# Patient Record
Sex: FEMALE | Race: WHITE
[De-identification: names, ages, dates, MRNs, and addresses within clinical notes are randomized per-mention and may not be internally consistent; named-entity substitution may affect disease eponyms.]

---

## 2019-04-05 ENCOUNTER — HOSPITAL ENCOUNTER (EMERGENCY)
Dept: HOSPITAL 80 - FED | Age: 24
Discharge: HOME | End: 2019-04-05
Payer: MEDICAID

## 2019-04-05 VITALS — DIASTOLIC BLOOD PRESSURE: 79 MMHG | SYSTOLIC BLOOD PRESSURE: 121 MMHG

## 2019-04-05 DIAGNOSIS — S92.251A: ICD-10-CM

## 2019-04-05 DIAGNOSIS — S30.810A: ICD-10-CM

## 2019-04-05 DIAGNOSIS — S81.811A: ICD-10-CM

## 2019-04-05 DIAGNOSIS — S92.511A: ICD-10-CM
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
## 2019-04-05 DIAGNOSIS — S92.121A: ICD-10-CM

## 2019-04-05 DIAGNOSIS — S90.511A: ICD-10-CM

## 2019-04-05 DIAGNOSIS — S92.231A: ICD-10-CM

## 2019-04-05 DIAGNOSIS — S92.211A: Primary | ICD-10-CM

## 2019-04-05 DIAGNOSIS — S62.316A: ICD-10-CM

## 2019-04-05 LAB — PLATELET # BLD: 231 10^3/UL (ref 150–400)

## 2019-04-05 NOTE — EDPHY
HPI/HX/ROS/PE/MDM


Narrative: 








CHIEF COMPLAINT: Fall climbing, FTA 





HPI:    





This patient is a healthy 23-year-old female arriving via private vehicle, 

brought from the waiting room as a full trauma activation due to a reported 

40ft fall when climbing this morning around 10:15am. She was climbing the first 

flatiron this morning and states she slid and fell about 40ft. She states she 

landed on a ledge which prevented her from falling farther. She did strike her 

head but denies any loss of consciousness. She had a prolonged extrication by 

[facility] from the [facility]. After rescue, she declined ambulance 

transport. She presents to the emergency department by private vehicle 

primarily complaining of right leg pain, and she is concerned that her "ankle 

doesn't look awesome". The ankle has been splinted by [facility] personnel. She 

sustained a laceration to her right knee as well. She denies any other recent 

trauma or illness. She is well appearing, alert and oriented. 





REVIEW OF SYSTEMS:


A comprehensive 10 system review of systems is otherwise negative aside from 

elements mentioned in the history of present illness and medical decision 

making.





PMH: Denies





SOCIAL HISTORY: Mother at bedside. Recent [employer] graduate. Works as a substitute 

teacher. 





PHYSICAL EXAM:


General:Patient is alert, in no acute distress.


Head: Normocephalic. Abrasion to left temple, otherwise atraumatic. 


ENT:Eyes are normal to inspection.  ENT inspection normal. No hemotympanum. 


Neck: Normal inspection.  Full range of motion.


Respiratory:No respiratory distress.  Breath sounds normal bilaterally.


Cardiovascular: Regular rate and rhythm.  Strong peripheral pulses.  Normal cap 

refill.


Abdomen:The abdomen is nontender to palpation. There are no peritoneal signs. 

There are normal bowel sounds.


Back: Abrasion to right buttock. Normal to inspection.  No tenderness to 

palpation, no step-offs.


Skin: Normal color.  No rash.  Warm and dry.


Extremities: Obvious deformity/dislocation to right ankle, intact  Abrasion to 

right lateral malleolus. 4cm linear laceration over right shin proximal to the 

knee. Full range of motion.


Neuro: Oriented x3.  Normal motor function.  Normal sensory function.





ED Course: 





14:46 Met patient on arrival. 23-year-old female presents with obvious 

deformity to the right ankle as well as various abrasions and a laceration 

after part sliding, part falling about 40 feet down the first flatiron while 

climbing this morning around 10:15am. Dr. Vallejo, general surgeon, at 

bedside. Patient is alert and oriented, arrived by private vehicle. She is well-

appearing. Plan to administer 4mg IV morphine, 1L IV NS for symptom relief. 





15:00 Dr. Stone and I agree to downgrade the trauma alert. Downgraded at 

this time. 





Reviewed x-rays. Evidence of mid-foot fracture. 





The patient seems to have sustained an isolated right ankle injury. No other 

injuries identified. Plan to consult with orthopedics. 





15:12 Spoke with Dr. Silver, orthopedic specialist. He will review the patient'

s x-ray. 





15:23 Consulted with Dr. Silver. Recommends reduction of patient's subtalar 

dislocation here in the emergency department followed by three-way splint. 





Patients mother now at bedside. She notes the patient has history of congenital 

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 

considerable physical therapy. 





15:30 Spoke with Dr. Silver. He recommends we proceed with reduction at this 

time. 





16:05 Attempted reduction with 100mcg IV Fentanyl. Unable to reduce. 





16:15 Spoke with Dr. Silver. He requests CT of the right foot. When this is 

obtained he will come to evaluate for possible reduction with conscious 

sedation. 





Laceration Repair: 


Verbal consent was obtained from the patient. The linear 4cm laceration on the 

right shin was anesthetized using bupivacaine with epinephrine. The wound was 

cleaned with standard ED protocol, draped and explored to its base with a 

gloved finger. The wound was repaired in single layer technique with four 4-0 

Prolene sutures. The wound repair was simple. The procedure was performed by 

myself, Dr. Eid.





Dr. Silver has reviewed CT and assessed the patient. Plan for splint placement 

under standard ED protocol and followup outpatient with orthopedics. 





Reassessed patient. Discussed imaging results and the plan for orthopedic 

followup. Return precautions discussed. She and her mother are comfortable with 

this plan. 





- Data Points


Imaging Results: 


 Imaging Impressions





Ankle X-Ray  04/05/19 14:52


Impression: 


1. Intact ankle mortise.


2. Derangement mid foot with several fractures and disrupted joint spaces. 

Recommend CT of the foot and ankle to optimally characterize.








Tibia/Fibula X-Ray  04/05/19 14:52


Impression: Intact shaft of tibia and fibula.








Extremity CT  04/05/19 16:13


Impression:


1. Multiple comminuted displaced fractures of the right foot tarsal bones 

especially the cuboid and navicular bones, third toe proximal phalanx base, 

with smaller fracture fragments involving the talus, cuneiforms, and base of 

the fifth metacarpal as described above.


2. Recommend dedicated x-rays of the right foot or toes.


 


Findings and recommendations discussed with Emergency Department physician, 

Fortino Eid MD, at 1720 hour, 4/5/2019.


 


Final report concurs with initial preliminary interpretation.


 


 











Imaging: Discussed imaging studies w/ On call Radiologist, I viewed and 

interpreted images myself


Laboratory Results: 


 Laboratory Results





 04/05/19 15:17 





 04/05/19 15:17 





 











  04/05/19 04/05/19 04/05/19





  15:17 15:17 15:17


 


WBC      10.34 10^3/uL H 10^3/uL





     (3.80-9.50) 


 


RBC      5.05 10^6/uL 10^6/uL





     (4.18-5.33) 


 


Hgb      15.0 g/dL g/dL





     (12.6-16.3) 


 


Hct      44.6 % %





     (38.0-47.0) 


 


MCV      88.3 fL fL





     (81.5-99.8) 


 


MCH      29.7 pg pg





     (27.9-34.1) 


 


MCHC      33.6 g/dL g/dL





     (32.4-36.7) 


 


RDW      12.8 % %





     (11.5-15.2) 


 


Plt Count      231 10^3/uL 10^3/uL





     (150-400) 


 


MPV      9.3 fL fL





     (8.7-11.7) 


 


Neut % (Auto)      83.4 % H %





     (39.3-74.2) 


 


Lymph % (Auto)      10.7 % L %





     (15.0-45.0) 


 


Mono % (Auto)      5.2 % %





     (4.5-13.0) 


 


Eos % (Auto)      0.0 % L %





     (0.6-7.6) 


 


Baso % (Auto)      0.2 % L %





     (0.3-1.7) 


 


Nucleat RBC Rel Count      0.0 % %





     (0.0-0.2) 


 


Absolute Neuts (auto)      8.62 10^3/uL H 10^3/uL





     (1.70-6.50) 


 


Absolute Lymphs (auto)      1.11 10^3/uL 10^3/uL





     (1.00-3.00) 


 


Absolute Monos (auto)      0.54 10^3/uL 10^3/uL





     (0.30-0.80) 


 


Absolute Eos (auto)      0.00 10^3/uL L 10^3/uL





     (0.03-0.40) 


 


Absolute Basos (auto)      0.02 10^3/uL 10^3/uL





     (0.02-0.10) 


 


Absolute Nucleated RBC      0.00 10^3/uL 10^3/uL





     (0-0.01) 


 


Immature Gran %      0.5 % %





     (0.0-1.1) 


 


Immature Gran #      0.05 10^3/uL 10^3/uL





     (0.00-0.10) 


 


Sodium    136 mEq/L mEq/L  





    (135-145)  


 


Potassium    4.0 mEq/L mEq/L  





    (3.5-5.2)  


 


Chloride    103 mEq/L mEq/L  





    ()  


 


Carbon Dioxide    22 mEq/l mEq/l  





    (22-31)  


 


Anion Gap    11 mEq/L mEq/L  





    (6-14)  


 


BUN    19 mg/dL mg/dL  





    (7-23)  


 


Creatinine    0.6 mg/dL mg/dL  





    (0.6-1.0)  


 


Estimated GFR    > 60   





    


 


Glucose    96 mg/dL mg/dL  





    ()  


 


Calcium    9.3 mg/dL mg/dL  





    (8.5-10.4)  


 


Beta HCG, Qual  NEGATIVE     





    











Medications Given: 


 








Discontinued Medications





Diphtheria/Tetanus/Acell Pertussis (Boostrix)  0.5 ml IM .ONCE ONE


   Stop: 04/05/19 15:48


   Last Admin: 04/05/19 16:14 Dose:  Not Given


Fentanyl (Sublimaze)  50 mcg IVP EDNOW ONE


   Stop: 04/05/19 15:37


   Last Admin: 04/05/19 15:38 Dose:  50 mcg


Fentanyl (Sublimaze)  50 mcg IVP EDNOW ONE


   Stop: 04/05/19 15:59


   Last Admin: 04/05/19 15:58 Dose:  50 mcg


Sodium Chloride (Ns)  1,000 mls @ 0 mls/hr IV EDNOW ONE; Wide Open


   PRN Reason: Protocol


   Stop: 04/05/19 14:54


   Last Admin: 04/05/19 15:23 Dose:  1,000 mls


Morphine Sulfate (Morphine)  4 mg IVP EDNOW ONE


   Stop: 04/05/19 14:54


   Last Admin: 04/05/19 14:53 Dose:  4 mg








General


Time Seen by Provider: 04/05/19 14:44


Initial Vital Signs: 


 Initial Vital Signs











Temperature (C)  36.6 C   04/05/19 14:41


 


Heart Rate  84   04/05/19 14:41


 


Respiratory Rate  16   04/05/19 14:41


 


Blood Pressure  116/82 H  04/05/19 14:41


 


O2 Sat (%)  99   04/05/19 14:41








 











O2 Delivery Mode               Room Air














Allergies/Adverse Reactions: 


 





No Known Drug Allergies Allergy (Verified 04/05/19 15:06)


 








Home Medications: 














 Medication  Instructions  Recorded


 


Ondansetron Odt [Zofran Odt] 4 mg PO Q4PRN PRN #10 tab 04/05/19


 


traMADol [Ultram 50 mg (*)] 50 mg PO Q4 PRN #30 tab 04/05/19














Departure





- Departure


Disposition: Home, Routine, Self-Care


Clinical Impression: 


Cuboid fracture


Qualifiers:


 Encounter type: initial encounter Fracture type: closed Fracture alignment: 

displaced Laterality: right Qualified Code(s): S92.211A - Displaced fracture of 

cuboid bone of right foot, initial encounter for closed fracture





Laceration of leg


Qualifiers:


 Encounter type: initial encounter Laterality: right Qualified Code(s): 

S81.811A - Laceration without foreign body, right lower leg, initial encounter





Fracture of intermediate cuneiform bone of right foot


Qualifiers:


 Encounter type: initial encounter Fracture type: closed Fracture alignment: 

displaced Qualified Code(s): S92.231A - Displaced fracture of intermediate 

cuneiform of right foot, initial encounter for closed fracture





Navicular fracture of ankle


Qualifiers:


 Encounter type: initial encounter Fracture type: closed Fracture alignment: 

displaced Laterality: right Qualified Code(s): S92.251A - Displaced fracture of 

navicular [scaphoid] of right foot, initial encounter for closed fracture





Fracture of talus of right ankle, closed


Qualifiers:


 Encounter type: initial encounter Talus location: body Fracture alignment: 

displaced Qualified Code(s): S92.121A - Displaced fracture of body of right 

talus, initial encounter for closed fracture





Condition: Good


Instructions:  Care For Your Stitches (ED), Laceration (ED), Foot Fracture in 

Adults (ED)


Additional Instructions: 


Rest, ice, elevation.  





Take Ultram as prescribed as needed for pain. Take Zofran as prescribed as 

needed for nausea. 





Follow up with Dr. Amaro, orthopedic surgeon, within one week. Wear splint at 

all times until reevaluation.





Sutures out in 10-14 days.  





Return to the Emergency Department for fever, redness, discharge from wound, 

increasing pain or other worsening of condition. Return to the emergency 

department for worsening pain, swelling, numbness, weakness or other concerns.  


Referrals: 


Denise Guzman MD [Primary Care Provider] - As per Instructions


Ermias Amaro MD [Medical Doctor] - As per Instructions


Prescriptions: 


Ondansetron Odt [Zofran Odt] 4 mg PO Q4PRN PRN #10 tab


 PRN Reason: Nausea


traMADol [Ultram 50 mg (*)] 50 mg PO Q4 PRN #30 tab


 PRN Reason: Pain, Severe


Report Scribed for: Fortino Eid


Report Scribed by: Susan Sutherland


Date of Report: 04/05/19


Time of Report: 14:54


Physician Review and Approval Statement: Portions of this note were transcribed 

by an ED scribe.  I personally performed the history, physical exam, and 

medical decision making; and confirm the accuracy of the information in the 

transcribed note.

## 2019-04-05 NOTE — ASMTCMCOM
CM Note

 

CM Note                       

Notes:

Late Entry:



Reviewed chart. Pt presented to the Emergency Department via private vehicle as a trauma 

activation, s/p a 40 ft slide/fall, while rock climbing. History is unremarkable. Pt is a recent [employer] 


graduate. She is accompanied by her mother. CAGE completed per trauma protocol. Renate SALINAS with 

[facility] met with pt; no needs identified. Pt to discharge home independently with family 

support. Pt to follow up with orthopedics as directed. CM available for any further issues or 

concerns. 

 

Date Signed:  04/05/2019 07:50 PM

Electronically Signed By:Nicki Kent RN

## 2019-04-06 NOTE — GCON
[f rep st]



                                                                    CONSULTATION





EMERGENCY DEPARTMENT CONSULTATION





CHIEF COMPLAINT:  She presents for right ankle pain and deformity.



HISTORY OF PRESENT ILLNESS:  The patient is a 23-year-old recent [employer] graduate who was brought to the e
mergency department after falling 40 feet while climbing the first flat iron this morning.  She state
s she slid and fell approximately 40 feet.  She did not strike her head, had no loss of consciousness
.  She was extricated by [facility] and she presented by private car complaining of ankle 
pain and deformity.  She has a history of congenital clubbed foot and numerous surgical corrections a
s a child.  She always has a slight curvature to her ankle by her subjective report.  She is unable t
o bear weight.



PAST MEDICAL HISTORY:  Denies.



PAST SURGICAL HISTORY:  As above.



MEDICATIONS:  See chart.



ALLERGIES:  None.



SOCIAL HISTORY:  Recent [employer] graduate.  Her mother is at her bedside.



OBJECTIVE:  GENERAL:  This is a healthy young woman, pleasant and cooperative with examination. 

EXTREMITIES:  Examination of the right lower extremity reveals an inverted position.  She has well-he
aled surgical incisions.  She has large swelling and ecchymosis over the lateral aspect of her ankle.
  Intact digital flexion and extension without increased discomfort or complaint.  Limited ankle plan
tar flexion, dorsiflexion secondary to pain.  Sensation is grossly intact to light touch across the s
uperficial and deep peroneal, saphenous, sural, posterior tibial nerve distribution.  Digital tips ar
e warm with 2 to 3 second capillary refill.



RADIOGRAPHS:  X-ray of her tib/fib, ankle x-ray and extremity CT demonstrate medial displacement of h
er mid foot.  There are numerous small comminuted fragments of her navicular base of the 5th metatars
al and navicular bones, 3rd toe proximal phalanx, the talus and the cuneiforms.  She has a varus orie
ntation of her tibiotalar joint which is chronic.  The ankle and subtalar joint are reduced. 



I have recommended splint application with a bulky padded splint, strict ice and elevation, strict no
nweightbearing.  I have spoken with Ermias Dinh at [facility].  Her mother had seen Dr. Mykel frye previously.  I recommend followup with him for repeat evaluation and possible surgical stabilizatio
n.  I have outlined this with her at length.  I have given her my contact information if she has inte
rvening focal issues or pain control issues, then she may speak with me.  Otherwise, follow up with MADHAVI Dinh as above.





Job #:  [id number]

## 2024-01-09 NOTE — ASMTCAGE
CAGE

 

Additional Comments           CAGE completed per trauma protocol. Pt is an avid 

                              athlete, questions not applicable. No indications 

                              of substance/alcohol abuse.

 

Date Signed:  04/05/2019 07:44 PM

Electronically Signed By:Nicki Kent RN
yes

## 2024-11-26 ENCOUNTER — OFFICE VISIT (OUTPATIENT)
Dept: OBGYN | Facility: CLINIC | Age: 29
End: 2024-11-26
Payer: COMMERCIAL

## 2024-11-26 ENCOUNTER — HOSPITAL ENCOUNTER (OUTPATIENT)
Facility: MEDICAL CENTER | Age: 29
End: 2024-11-26
Payer: COMMERCIAL

## 2024-11-26 ENCOUNTER — RESEARCH ENCOUNTER (OUTPATIENT)
Dept: RESEARCH | Facility: MEDICAL CENTER | Age: 29
End: 2024-11-26

## 2024-11-26 VITALS
DIASTOLIC BLOOD PRESSURE: 78 MMHG | WEIGHT: 155 LBS | HEIGHT: 67 IN | BODY MASS INDEX: 24.33 KG/M2 | SYSTOLIC BLOOD PRESSURE: 116 MMHG

## 2024-11-26 DIAGNOSIS — Z11.3 ROUTINE SCREENING FOR STI (SEXUALLY TRANSMITTED INFECTION): ICD-10-CM

## 2024-11-26 DIAGNOSIS — Z12.4 SCREENING FOR CERVICAL CANCER: ICD-10-CM

## 2024-11-26 DIAGNOSIS — Z01.419 WELL WOMAN EXAM: ICD-10-CM

## 2024-11-26 DIAGNOSIS — N93.9 ABNORMAL UTERINE BLEEDING: ICD-10-CM

## 2024-11-26 DIAGNOSIS — E55.9 VITAMIN D DEFICIENCY: ICD-10-CM

## 2024-11-26 PROCEDURE — 87660 TRICHOMONAS VAGIN DIR PROBE: CPT

## 2024-11-26 PROCEDURE — 87491 CHLMYD TRACH DNA AMP PROBE: CPT

## 2024-11-26 PROCEDURE — 87480 CANDIDA DNA DIR PROBE: CPT

## 2024-11-26 PROCEDURE — 87510 GARDNER VAG DNA DIR PROBE: CPT

## 2024-11-26 PROCEDURE — 88142 CYTOPATH C/V THIN LAYER: CPT

## 2024-11-26 PROCEDURE — 87591 N.GONORRHOEAE DNA AMP PROB: CPT

## 2024-11-26 NOTE — PROGRESS NOTES
Patient here for annual well woman exam  Patient reports very irregular cycles  LMP:  9/15/2024  Last Pap/Results: 3+ years ago - denies abnormal history  Last Mammogram:  Never  Sexually Active Yes with female partner  Birth Control Method: Nothing  Phone: [phone]  Pharmacy:  [facility]

## 2024-11-26 NOTE — PROGRESS NOTES
ANNUAL GYNECOLOGY VISIT    Chief Complaint  Annual    Subjective  Mary Alice Zaidi is a 28 y.o. female  Patient's last menstrual period was 09/15/2024 (approximate). using nothing for contraception and is in a monogamous female relationship who presents today for Annual Exam. Started her menses at 17. Started Depo in high school, didn't like it and then tried OCP, for a few years. She stopped the OCP for a year and coninued to have irregular menses. She then got a Mirena for a few years and then the Mirena was removed in  because she wasn't sexually active with a male anymore. She then started having irregular periods every 30-60 days lasting for 5 days. She states she had blood work done a few years ago that showed low estrogen. No fluctuation in weight, no hair growth, no weight gain, and not acne. She has had workup in the past for PCOS and it didn't show anything significant. This is the longest that she has gone with 70 days in between her period.       Preventive Care   Immunization History   Administered Date(s) Administered    PFIZER PURPLE CAP SARS-COV-2 VACCINATION (12+) 10/18/2021       Guardasil HPV vaccine:     Gynecology History and ROS  Current Sexual Activity: yes - one female   History of sexually transmitted diseases? no  Abnormal discharge? no  Current Contraception:  nothing     Menstrual History  Patient's last menstrual period was 09/15/2024 (approximate).  Periods are irregular  q 35-70 days, lasting 5 days.   Clots or heavy flow: no  Dysmenorrhea: no  Intermenstrual bleeding/spotting: no  Significant pain with periods:no  Bothersome PMS symptoms: no  Significant Pelvic Pain: no    Pap History  Last pap smear: more than 3 years ago per pt.   History of moderate or severe dysplasia: no    Cancer Risk Assessement:  Family history of:   - Breast cancer: maternal grandmother.    - Ovarian cancer: no   - Uterine cancer: no   - Colon cancer: maternal grandfather and 2 uncles  "    Obstetric History  OB History    Para Term  AB Living   0 0 0 0 0 0   SAB IAB Ectopic Molar Multiple Live Births   0 0 0 0 0 0       Past Medical History  History reviewed. No pertinent past medical history.    Past Surgical History  Past Surgical History:   Procedure Laterality Date    TONSILLECTOMY         Social History  Social History     Tobacco Use    Smoking status: Never     Passive exposure: Never    Smokeless tobacco: Never   Vaping Use    Vaping status: Never Used   Substance Use Topics    Alcohol use: Yes     Alcohol/week: 0.6 oz     Types: 1 Glasses of wine per week     Comment: Once per week    Drug use: Not Currently        Family History  Family History   Problem Relation Age of Onset    Hypertension Mother     Colon Cancer Maternal Uncle     Breast Cancer Maternal Grandmother     Colon Cancer Maternal Grandfather        Home Medications  No current outpatient medications on file.       Allergies/Reactions  No Known Allergies    ROS  Positive ROS: abnormal uterine bleeding   Gen: no fevers or chills, no significant weight loss or gain, excessive fatigue  Respiratory:  no cough or dyspnea  Cardiac:  no chest pain, no palpitations, no syncope  Breast: no breast discharge, pain, lump or skin changes  GI:  no heartburn, no abdominal pain, no nausea or vomiting  Urinary: no dysuria, urgency, frequency, incontinence   Psych: no depression or anxiety  Neuro: no migraines with aura, fainting spells, numbness or tingling  Extremities: no joint pain, persistently swollen ankles, recurrent leg cramps      Physical Examination:  Vital Signs: /78 (BP Location: Left arm, Patient Position: Sitting, BP Cuff Size: Adult)   Ht 5' 7\"   Wt 155 lb   LMP 09/15/2024 (Approximate)   BMI 24.28 kg/m²       Constitutional: The patient is well developed and well nourished.  Psychiatric: Patient is oriented to time place and person.   Skin: No rash observed.  Neck: Appears symmetric. Thyroid " normal size  Respiratory: normal effort  Breast: Inspection reveals no asymetry or nipple discharge, no skin thickening, dimpling or erythema.  Palpation demonstrates no masses.  Abdomen: Soft, non-tender.  Pelvic Exam:      Vulva: external female genitalia are normal in appearance. No lesions     Urethra - no lesions, no erythema     Vagina: moist, pink, normal ruggae     Cervix: pink, smooth, no lesions, no CMT     Uterus - non-tender, normal size, shape, contour, mobile, anteverted     Ovaries: non-tender, no appreciable masses    Pap Smear performed: Yes    Chaperone Present: Shagufta Karimi MA  Extremeties: Legs are symmetric and without tenderness. There is no edema present.        Assessment & Plan  Mary Alice Zaidi is a 28 y.o. female who presents today for Annual Gyn Exam.     1. Well woman exam    Anticipatory guidance given. Encouraged adequate water intake, healthy diet, regular exercise. Educated on Pap smear screening and guidelines for age per ACOG and ASSCP. Discussed safe sex, STI prevention, contraception/family planning. Self breast exam taught.     - Referral to Genetic Research Studies  - TSH WITH REFLEX TO FT4; Future  - CBC WITH DIFFERENTIAL; Future  - Comp Metabolic Panel; Future  - THYROID PEROXIDASE  (TPO) AB; Future  - VITAMIN D,25 HYDROXY (DEFICIENCY); Future  - Lipid Profile; Future  - HEMOGLOBIN A1C; Future  - VAGINAL PATHOGENS DNA PANEL; collected  - THINPREP RFLX HPV ASCUS W/CTNG; collected    2. Screening for cervical cancer    - VAGINAL PATHOGENS DNA PANEL; collected  - THINPREP RFLX HPV ASCUS W/CTNG; collected    3. Routine screening for STI (sexually transmitted infection)  - VAGINAL PATHOGENS DNA PANEL; collected  - THINPREP RFLX HPV ASCUS W/CTNG; collected    4. Abnormal uterine bleeding  Pt moved here from [place] and has not established care with a PCP. She has not had any recent lab work done and she would like to do that before she tried any sort of medication or  hormones.     - THYROID PEROXIDASE  (TPO) AB; Future  - US-PELVIC COMPLETE (TRANSABDOMINAL/TRANSVAGINAL) (COMBO); Future  - FSH/LH; Future  - ESTRADIOL; Future  - PROGESTERONE; Future  - PROLACTIN; Future  - 17-OH PROGESTERONE; Future  - TESTOSTERONE F&T FEMALES/CHILD; Future  - DHEA SULFATE; Future    5. Vitamin D deficiency    - VITAMIN D,25 HYDROXY (DEFICIENCY); Future      Return: Annually or PRN    KYLER Valverde  [facility]

## 2024-11-27 DIAGNOSIS — B37.9 YEAST INFECTION: ICD-10-CM

## 2024-11-27 DIAGNOSIS — N76.0 BV (BACTERIAL VAGINOSIS): ICD-10-CM

## 2024-11-27 DIAGNOSIS — B96.89 BV (BACTERIAL VAGINOSIS): ICD-10-CM

## 2024-11-27 LAB
C TRACH DNA GENITAL QL NAA+PROBE: NEGATIVE
CANDIDA DNA VAG QL PROBE+SIG AMP: POSITIVE
G VAGINALIS DNA VAG QL PROBE+SIG AMP: POSITIVE
N GONORRHOEA DNA GENITAL QL NAA+PROBE: NEGATIVE
SPECIMEN SOURCE: NORMAL
T VAGINALIS DNA VAG QL PROBE+SIG AMP: NEGATIVE

## 2024-11-27 RX ORDER — METRONIDAZOLE 7.5 MG/G
GEL TOPICAL
Qty: 45 G | Refills: 0 | Status: SHIPPED | OUTPATIENT
Start: 2024-11-27

## 2024-11-27 RX ORDER — FLUCONAZOLE 150 MG/1
TABLET ORAL
Qty: 2 TABLET | Refills: 0 | Status: SHIPPED | OUTPATIENT
Start: 2024-11-27

## 2024-12-06 LAB — THINPREP PAP, CYTOLOGY NL11781: NORMAL

## 2024-12-09 DIAGNOSIS — Z00.6 CLINICAL TRIAL PARTICIPANT: ICD-10-CM

## 2024-12-18 ENCOUNTER — HOSPITAL ENCOUNTER (OUTPATIENT)
Dept: LAB | Facility: MEDICAL CENTER | Age: 29
End: 2024-12-18
Payer: COMMERCIAL

## 2024-12-18 DIAGNOSIS — N93.9 ABNORMAL UTERINE BLEEDING: ICD-10-CM

## 2024-12-18 DIAGNOSIS — Z01.419 WELL WOMAN EXAM: ICD-10-CM

## 2024-12-18 DIAGNOSIS — E55.9 VITAMIN D DEFICIENCY: ICD-10-CM

## 2024-12-18 LAB
25(OH)D3 SERPL-MCNC: 31 NG/ML (ref 30–100)
ALBUMIN SERPL BCP-MCNC: 4.5 G/DL (ref 3.2–4.9)
ALBUMIN/GLOB SERPL: 1.7 G/DL
ALP SERPL-CCNC: 36 U/L (ref 30–99)
ALT SERPL-CCNC: 14 U/L (ref 2–50)
ANION GAP SERPL CALC-SCNC: 11 MMOL/L (ref 7–16)
AST SERPL-CCNC: 24 U/L (ref 12–45)
BASOPHILS # BLD AUTO: 0.4 % (ref 0–1.8)
BASOPHILS # BLD: 0.02 K/UL (ref 0–0.12)
BILIRUB SERPL-MCNC: 0.8 MG/DL (ref 0.1–1.5)
BUN SERPL-MCNC: 16 MG/DL (ref 8–22)
CALCIUM ALBUM COR SERPL-MCNC: 8.6 MG/DL (ref 8.5–10.5)
CALCIUM SERPL-MCNC: 9 MG/DL (ref 8.5–10.5)
CHLORIDE SERPL-SCNC: 103 MMOL/L (ref 96–112)
CHOLEST SERPL-MCNC: 193 MG/DL (ref 100–199)
CO2 SERPL-SCNC: 25 MMOL/L (ref 20–33)
CREAT SERPL-MCNC: 0.55 MG/DL (ref 0.5–1.4)
DHEA-S SERPL-MCNC: 190 UG/DL (ref 98.8–340)
EOSINOPHIL # BLD AUTO: 0.07 K/UL (ref 0–0.51)
EOSINOPHIL NFR BLD: 1.4 % (ref 0–6.9)
ERYTHROCYTE [DISTWIDTH] IN BLOOD BY AUTOMATED COUNT: 43.3 FL (ref 35.9–50)
EST. AVERAGE GLUCOSE BLD GHB EST-MCNC: 100 MG/DL
ESTRADIOL SERPL-MCNC: 41.9 PG/ML
FSH SERPL-ACNC: 8.6 MIU/ML
GFR SERPLBLD CREATININE-BSD FMLA CKD-EPI: 127 ML/MIN/1.73 M 2
GLOBULIN SER CALC-MCNC: 2.6 G/DL (ref 1.9–3.5)
GLUCOSE SERPL-MCNC: 87 MG/DL (ref 65–99)
HBA1C MFR BLD: 5.1 % (ref 4–5.6)
HCT VFR BLD AUTO: 42.3 % (ref 37–47)
HDLC SERPL-MCNC: 68 MG/DL
HGB BLD-MCNC: 13.9 G/DL (ref 12–16)
IMM GRANULOCYTES # BLD AUTO: 0.01 K/UL (ref 0–0.11)
IMM GRANULOCYTES NFR BLD AUTO: 0.2 % (ref 0–0.9)
LDLC SERPL CALC-MCNC: 114 MG/DL
LH SERPL-ACNC: 10 IU/L
LYMPHOCYTES # BLD AUTO: 2.12 K/UL (ref 1–4.8)
LYMPHOCYTES NFR BLD: 43.1 % (ref 22–41)
MCH RBC QN AUTO: 29.6 PG (ref 27–33)
MCHC RBC AUTO-ENTMCNC: 32.9 G/DL (ref 32.2–35.5)
MCV RBC AUTO: 90.2 FL (ref 81.4–97.8)
MONOCYTES # BLD AUTO: 0.26 K/UL (ref 0–0.85)
MONOCYTES NFR BLD AUTO: 5.3 % (ref 0–13.4)
NEUTROPHILS # BLD AUTO: 2.44 K/UL (ref 1.82–7.42)
NEUTROPHILS NFR BLD: 49.6 % (ref 44–72)
NRBC # BLD AUTO: 0 K/UL
NRBC BLD-RTO: 0 /100 WBC (ref 0–0.2)
PLATELET # BLD AUTO: 217 K/UL (ref 164–446)
PMV BLD AUTO: 10.2 FL (ref 9–12.9)
POTASSIUM SERPL-SCNC: 4 MMOL/L (ref 3.6–5.5)
PROGEST SERPL-MCNC: 0.14 NG/ML
PROLACTIN SERPL-MCNC: 5.54 NG/ML (ref 2.8–26)
PROT SERPL-MCNC: 7.1 G/DL (ref 6–8.2)
RBC # BLD AUTO: 4.69 M/UL (ref 4.2–5.4)
SODIUM SERPL-SCNC: 139 MMOL/L (ref 135–145)
THYROPEROXIDASE AB SERPL-ACNC: 10 IU/ML (ref 0–9)
TRIGL SERPL-MCNC: 54 MG/DL (ref 0–149)
TSH SERPL DL<=0.005 MIU/L-ACNC: 2.66 UIU/ML (ref 0.38–5.33)
WBC # BLD AUTO: 4.9 K/UL (ref 4.8–10.8)

## 2024-12-18 PROCEDURE — 84146 ASSAY OF PROLACTIN: CPT

## 2024-12-18 PROCEDURE — 85025 COMPLETE CBC W/AUTO DIFF WBC: CPT

## 2024-12-18 PROCEDURE — 84443 ASSAY THYROID STIM HORMONE: CPT

## 2024-12-18 PROCEDURE — 82670 ASSAY OF TOTAL ESTRADIOL: CPT

## 2024-12-18 PROCEDURE — 84403 ASSAY OF TOTAL TESTOSTERONE: CPT

## 2024-12-18 PROCEDURE — 36415 COLL VENOUS BLD VENIPUNCTURE: CPT

## 2024-12-18 PROCEDURE — 82627 DEHYDROEPIANDROSTERONE: CPT

## 2024-12-18 PROCEDURE — 83498 ASY HYDROXYPROGESTERONE 17-D: CPT

## 2024-12-18 PROCEDURE — 83002 ASSAY OF GONADOTROPIN (LH): CPT

## 2024-12-18 PROCEDURE — 82306 VITAMIN D 25 HYDROXY: CPT

## 2024-12-18 PROCEDURE — 84270 ASSAY OF SEX HORMONE GLOBUL: CPT

## 2024-12-18 PROCEDURE — 83001 ASSAY OF GONADOTROPIN (FSH): CPT

## 2024-12-18 PROCEDURE — 80053 COMPREHEN METABOLIC PANEL: CPT

## 2024-12-18 PROCEDURE — 83036 HEMOGLOBIN GLYCOSYLATED A1C: CPT

## 2024-12-18 PROCEDURE — 86376 MICROSOMAL ANTIBODY EACH: CPT

## 2024-12-18 PROCEDURE — 80061 LIPID PANEL: CPT

## 2024-12-18 PROCEDURE — 84402 ASSAY OF FREE TESTOSTERONE: CPT

## 2024-12-18 PROCEDURE — 84144 ASSAY OF PROGESTERONE: CPT

## 2024-12-23 LAB
17OHP SERPL-MCNC: 39.36 NG/DL
SHBG SERPL-SCNC: 92 NMOL/L (ref 25–122)
TESTOST FREE SERPL-MCNC: 2.9 PG/ML (ref 0.8–7.4)
TESTOST SERPL-MCNC: 35 NG/DL (ref 9–55)

## 2024-12-26 ENCOUNTER — HOSPITAL ENCOUNTER (OUTPATIENT)
Dept: LAB | Facility: MEDICAL CENTER | Age: 29
End: 2024-12-26
Attending: FAMILY MEDICINE

## 2024-12-26 DIAGNOSIS — Z00.6 CLINICAL TRIAL PARTICIPANT: ICD-10-CM

## 2025-01-15 ENCOUNTER — HOSPITAL ENCOUNTER (OUTPATIENT)
Dept: RADIOLOGY | Facility: MEDICAL CENTER | Age: 30
End: 2025-01-15
Payer: COMMERCIAL

## 2025-01-15 DIAGNOSIS — N93.9 ABNORMAL UTERINE BLEEDING: ICD-10-CM

## 2025-01-15 PROCEDURE — 76830 TRANSVAGINAL US NON-OB: CPT

## 2025-01-21 LAB
APOB+LDLR+PCSK9 GENE MUT ANL BLD/T: NOT DETECTED
BRCA1+BRCA2 DEL+DUP + FULL MUT ANL BLD/T: NOT DETECTED
MLH1+MSH2+MSH6+PMS2 GN DEL+DUP+FUL M: NOT DETECTED

## 2025-02-07 ENCOUNTER — TELEPHONE (OUTPATIENT)
Dept: OBGYN | Facility: CLINIC | Age: 30
End: 2025-02-07
Payer: COMMERCIAL

## 2025-02-08 NOTE — TELEPHONE ENCOUNTER
Call to patient, left VM for patient to return call as Korin would like her to see an MD to review labs and ultrasound. Did not leave any details on voicemail.

## 2025-02-14 ENCOUNTER — RESULTS FOLLOW-UP (OUTPATIENT)
Dept: OBGYN | Facility: CLINIC | Age: 30
End: 2025-02-14
Payer: COMMERCIAL

## 2025-02-14 NOTE — TELEPHONE ENCOUNTER
Message received  from  Korin  -  Can you call her and have her follow up with an MD for her irregular bleeding. Her labs and ultrasound don't look terrible but they can give her options to help her menses.    Call  to  patient, after confirming  pt identity, pt advised  that  she is still having ongoing issues with  irregular cycles/abnormal uterine  bleeding. Pt  would  like to  follow up and schedule  with an MD. Schedulers currently unavailable, so Patricia helped me schedule.  Appt scheduled  for  3/20/25 @ 9:45am  with Dr. Jones.

## 2025-03-18 PROBLEM — N93.9 ABNORMAL UTERINE BLEEDING: Status: ACTIVE | Noted: 2025-03-18

## 2025-03-18 NOTE — PROGRESS NOTES
[facility]OMEN[facility]S [facility]  GYN RETURN VISIT    CC:  Gynecologic Exam ([facility] on US and Labs)      Subjective   HPI: Patient is a 29 y.o.  who presents for follow up.  She was last seen by Korin Antunez in 2024 for her annual exam.  At that visit she reported a history of abnormal uterine bleeding with irregular periods every 30 to 60 days lasting for 5 days.  She had previously undergone a workup for PCOS which was negative.     Transvaginal ultrasound which was completed in January demonstrated an 11 mm endometrial thickness with no other significant uterine findings.  The right ovary appeared normal however the left ovary had a 2 cm complex cystic structure.  Her lab workup in 2024 was unremarkable.    Today she describes continued irregular menstrual cycles.  She said menarche occurred at age 17-18, she has had irregular cycles since that time.  Initially thought to be attributed to have her athletic activity as she was previously a professional cyclist.  She continues to be athletic and works out 5 times per week, usually Pilates as well as running and biking.  She is currently sexually active with women.  Does not have current desires for childbearing.     Review of Systems   All other systems reviewed and are negative.      History reviewed. No pertinent past medical history.  Past Surgical History:   Procedure Laterality Date    TONSILLECTOMY         Current Outpatient Medications:     medroxyPROGESTERone (PROVERA) 10 MG Tab, Take for five days each month to induce a period., Disp: 60 Tablet, Rfl: 2    metronidazole (METROGEL) 0.75 % gel, 1 applicatorful PV qhs x 5days, Disp: 45 g, Rfl: 0    fluconazole (DIFLUCAN) 150 MG tablet, Take one tablet once then again in 72 hours later, Disp: 2 Tablet, Rfl: 0  No Known Allergies    Objective   Vital Signs:   Vitals:    25 0933   BP: 119/71   BP Location: Right arm   Patient Position: Sitting   BP Cuff Size: Large adult   Weight: 158 lb 3.2  "oz   Height: 5' 7\"     Body mass index is 24.78 kg/m².    Physical Exam  Constitutional:       General: She is not in acute distress.     Appearance: She is normal weight.   Eyes:      Conjunctiva/sclera: Conjunctivae normal.   Pulmonary:      Effort: Pulmonary effort is normal.   Abdominal:      General: Abdomen is flat. There is no distension.   Neurological:      General: No focal deficit present.      Mental Status: She is alert.   Skin:     General: Skin is warm and dry.   Psychiatric:         Mood and Affect: Mood normal.         Behavior: Behavior normal.   Vitals and nursing note reviewed.         LABS & IMAGING    LABS 12/2024  Performed during follicular phase on CD10  TSH 2.66  Hgb 13.9  CMP wnl  Vit D 31  Lipid panel wnl aside from elevated LDL  A1c 5.1%  FSH 8.6  LH 10.0  Estradiol 41.9  Progesterone 0.14  Prolactin 5.54  17-OHP 39.36 - uncertain timing in cycle  Total T 35  Free T 2.9  SHBG 92  DHEA 190    US-PELVIC COMPLETE (TRANSABDOMINAL/TRANSVAGINAL) (COMBO) 01/15/2025    Narrative  1/15/2025 2:01 PM    HISTORY/REASON FOR EXAM:  Vaginal Bleeding; abnormal bleeding    TECHNIQUE/EXAM DESCRIPTION:  Transabdominal and transvaginal pelvic ultrasound.    COMPARISON:   None    FINDINGS:  Both transabdominal and transvaginal scanning were performed to optimally visualize the pelvis.    UTERUS:  The uterus measures 3.91 cm x 6.98 cm x 5.42 cm.  The uterine myometrium is within normal limits. There are multiple nabothian cysts.  The endometrial echo complex measures 1.12 cm.  Tiny cystic areas are noted within the endometrium.    OVARIES:  The right ovary measures 2.28 cm x 1.59 cm x 2.64 cm. Duplex Doppler examination of the right ovary shows normal waveforms. The right ovary is normal in size and appearance.    The left ovary measures 3.48 cm x 1.78 cm x 3.55 cm. Duplex Doppler examination of the left ovary shows normal waveforms. There is a complex, thick-walled cystic lesion within the left ovary " measuring 1.8 x 2.0 x 1.5 cm which may represent a complex cyst  or corpus luteum.    There is no free fluid seen.    Impression  1.  11 mm endometrial thickness.  2.  Normal-appearing right ovary.  3.  Complex cystic lesion within the left ovary may be reassessed in 1 1/2 menstrual cycles or 6 weeks.      Assessment & Plan   29 y.o.  presents for follow-up of AUB-O     #AUB-O  - Discussed my suspicion for an ovulatory dysfunction as a cause of her abnormal uterine bleeding given that no structural abnormality was seen on her ultrasound and her labs were within normal limits.  - I reviewed treatment options which include: medical management with OCPs, oral/IM progesterone, LNG-IUD, Depo-Lupron, or TXA; and surgical options with endometrial ablation, myomectomy, hysterectomy. We reviewed the R/B/A of each of these. After thorough counseling and shared decision-making, patient would like time to consider her options. Offered to send prescription for cyclic Provera to her pharmacy in case she elects to proceed with that option.  Patient amenable, Rx sent.    RTC 6 mo    Gabriela Jones M.D.

## 2025-03-20 ENCOUNTER — OFFICE VISIT (OUTPATIENT)
Dept: OBGYN | Facility: CLINIC | Age: 30
End: 2025-03-20
Payer: COMMERCIAL

## 2025-03-20 VITALS
HEIGHT: 67 IN | WEIGHT: 158.2 LBS | BODY MASS INDEX: 24.83 KG/M2 | SYSTOLIC BLOOD PRESSURE: 119 MMHG | DIASTOLIC BLOOD PRESSURE: 71 MMHG

## 2025-03-20 DIAGNOSIS — N93.9 ABNORMAL UTERINE BLEEDING: Primary | ICD-10-CM

## 2025-03-20 PROCEDURE — 3078F DIAST BP <80 MM HG: CPT | Performed by: STUDENT IN AN ORGANIZED HEALTH CARE EDUCATION/TRAINING PROGRAM

## 2025-03-20 PROCEDURE — 99214 OFFICE O/P EST MOD 30 MIN: CPT | Performed by: STUDENT IN AN ORGANIZED HEALTH CARE EDUCATION/TRAINING PROGRAM

## 2025-03-20 PROCEDURE — 3074F SYST BP LT 130 MM HG: CPT | Performed by: STUDENT IN AN ORGANIZED HEALTH CARE EDUCATION/TRAINING PROGRAM

## 2025-03-20 RX ORDER — MEDROXYPROGESTERONE ACETATE 10 MG
TABLET ORAL
Qty: 60 TABLET | Refills: 2 | Status: SHIPPED | OUTPATIENT
Start: 2025-03-20

## 2025-03-20 ASSESSMENT — FIBROSIS 4 INDEX: FIB4 SCORE: 0.86

## 2025-03-20 NOTE — PROGRESS NOTES
Patient here for GYN visit - FV on US and labs   Last seen on : 11/26/25 w/ Korin   LMP : Irreg 3/8/25  BCM : None  Pap : 11/26/24 NILM (-)   Phone/Pharmacy verified: [phone]    Pelvic US 1/15/25 shows 11mm Endometrial thickness, complex cystic lesion w/ LT ovary  Pt states she is wondering how she can get her irregular cycles in control (can last up to 40-80 days)